# Patient Record
Sex: MALE | Race: WHITE | Employment: FULL TIME | URBAN - METROPOLITAN AREA
[De-identification: names, ages, dates, MRNs, and addresses within clinical notes are randomized per-mention and may not be internally consistent; named-entity substitution may affect disease eponyms.]

---

## 2024-08-22 ENCOUNTER — OFFICE VISIT (OUTPATIENT)
Dept: FAMILY MEDICINE CLINIC | Facility: CLINIC | Age: 34
End: 2024-08-22
Payer: COMMERCIAL

## 2024-08-22 ENCOUNTER — LAB ENCOUNTER (OUTPATIENT)
Dept: LAB | Age: 34
End: 2024-08-22
Attending: STUDENT IN AN ORGANIZED HEALTH CARE EDUCATION/TRAINING PROGRAM
Payer: COMMERCIAL

## 2024-08-22 VITALS
HEIGHT: 70 IN | OXYGEN SATURATION: 97 % | SYSTOLIC BLOOD PRESSURE: 100 MMHG | TEMPERATURE: 97 F | DIASTOLIC BLOOD PRESSURE: 70 MMHG | HEART RATE: 83 BPM | WEIGHT: 171 LBS | BODY MASS INDEX: 24.48 KG/M2

## 2024-08-22 DIAGNOSIS — Z23 NEED FOR VACCINATION: ICD-10-CM

## 2024-08-22 DIAGNOSIS — Z00.00 ENCOUNTER FOR ROUTINE HISTORY AND PHYSICAL EXAMINATION: Primary | ICD-10-CM

## 2024-08-22 DIAGNOSIS — Z13.6 SCREENING FOR CARDIOVASCULAR CONDITION: ICD-10-CM

## 2024-08-22 DIAGNOSIS — M77.12 LATERAL EPICONDYLITIS OF BOTH ELBOWS: ICD-10-CM

## 2024-08-22 DIAGNOSIS — M25.561 ACUTE PAIN OF RIGHT KNEE: ICD-10-CM

## 2024-08-22 DIAGNOSIS — M77.11 LATERAL EPICONDYLITIS OF BOTH ELBOWS: ICD-10-CM

## 2024-08-22 LAB
ALBUMIN SERPL-MCNC: 5 G/DL (ref 3.2–4.8)
ALBUMIN/GLOB SERPL: 2 {RATIO} (ref 1–2)
ALP LIVER SERPL-CCNC: 77 U/L
ALT SERPL-CCNC: 47 U/L
ANION GAP SERPL CALC-SCNC: 6 MMOL/L (ref 0–18)
AST SERPL-CCNC: 28 U/L (ref ?–34)
BASOPHILS # BLD AUTO: 0.03 X10(3) UL (ref 0–0.2)
BASOPHILS NFR BLD AUTO: 0.5 %
BILIRUB SERPL-MCNC: 0.6 MG/DL (ref 0.3–1.2)
BUN BLD-MCNC: 24 MG/DL (ref 9–23)
CALCIUM BLD-MCNC: 9.8 MG/DL (ref 8.7–10.4)
CHLORIDE SERPL-SCNC: 105 MMOL/L (ref 98–112)
CHOLEST SERPL-MCNC: 178 MG/DL (ref ?–200)
CO2 SERPL-SCNC: 27 MMOL/L (ref 21–32)
CREAT BLD-MCNC: 0.99 MG/DL
EGFRCR SERPLBLD CKD-EPI 2021: 103 ML/MIN/1.73M2 (ref 60–?)
EOSINOPHIL # BLD AUTO: 0.09 X10(3) UL (ref 0–0.7)
EOSINOPHIL NFR BLD AUTO: 1.5 %
ERYTHROCYTE [DISTWIDTH] IN BLOOD BY AUTOMATED COUNT: 12.3 %
FASTING PATIENT LIPID ANSWER: YES
FASTING STATUS PATIENT QL REPORTED: YES
GLOBULIN PLAS-MCNC: 2.5 G/DL (ref 2–3.5)
GLUCOSE BLD-MCNC: 97 MG/DL (ref 70–99)
HCT VFR BLD AUTO: 43.5 %
HDLC SERPL-MCNC: 50 MG/DL (ref 40–59)
HGB BLD-MCNC: 15.1 G/DL
IMM GRANULOCYTES # BLD AUTO: 0.01 X10(3) UL (ref 0–1)
IMM GRANULOCYTES NFR BLD: 0.2 %
LDLC SERPL CALC-MCNC: 102 MG/DL (ref ?–100)
LYMPHOCYTES # BLD AUTO: 1.79 X10(3) UL (ref 1–4)
LYMPHOCYTES NFR BLD AUTO: 29.7 %
MCH RBC QN AUTO: 30.1 PG (ref 26–34)
MCHC RBC AUTO-ENTMCNC: 34.7 G/DL (ref 31–37)
MCV RBC AUTO: 86.7 FL
MONOCYTES # BLD AUTO: 0.5 X10(3) UL (ref 0.1–1)
MONOCYTES NFR BLD AUTO: 8.3 %
NEUTROPHILS # BLD AUTO: 3.6 X10 (3) UL (ref 1.5–7.7)
NEUTROPHILS # BLD AUTO: 3.6 X10(3) UL (ref 1.5–7.7)
NEUTROPHILS NFR BLD AUTO: 59.8 %
NONHDLC SERPL-MCNC: 128 MG/DL (ref ?–130)
OSMOLALITY SERPL CALC.SUM OF ELEC: 290 MOSM/KG (ref 275–295)
PLATELET # BLD AUTO: 240 10(3)UL (ref 150–450)
POTASSIUM SERPL-SCNC: 3.9 MMOL/L (ref 3.5–5.1)
PROT SERPL-MCNC: 7.5 G/DL (ref 5.7–8.2)
RBC # BLD AUTO: 5.02 X10(6)UL
SODIUM SERPL-SCNC: 138 MMOL/L (ref 136–145)
TRIGL SERPL-MCNC: 146 MG/DL (ref 30–149)
VLDLC SERPL CALC-MCNC: 24 MG/DL (ref 0–30)
WBC # BLD AUTO: 6 X10(3) UL (ref 4–11)

## 2024-08-22 PROCEDURE — 80061 LIPID PANEL: CPT

## 2024-08-22 PROCEDURE — 36415 COLL VENOUS BLD VENIPUNCTURE: CPT

## 2024-08-22 PROCEDURE — 90715 TDAP VACCINE 7 YRS/> IM: CPT | Performed by: STUDENT IN AN ORGANIZED HEALTH CARE EDUCATION/TRAINING PROGRAM

## 2024-08-22 PROCEDURE — 90471 IMMUNIZATION ADMIN: CPT | Performed by: STUDENT IN AN ORGANIZED HEALTH CARE EDUCATION/TRAINING PROGRAM

## 2024-08-22 PROCEDURE — 99385 PREV VISIT NEW AGE 18-39: CPT | Performed by: STUDENT IN AN ORGANIZED HEALTH CARE EDUCATION/TRAINING PROGRAM

## 2024-08-22 PROCEDURE — 85025 COMPLETE CBC W/AUTO DIFF WBC: CPT

## 2024-08-22 PROCEDURE — 99203 OFFICE O/P NEW LOW 30 MIN: CPT | Performed by: STUDENT IN AN ORGANIZED HEALTH CARE EDUCATION/TRAINING PROGRAM

## 2024-08-22 PROCEDURE — 80053 COMPREHEN METABOLIC PANEL: CPT

## 2024-08-22 NOTE — PROGRESS NOTES
[facility]     CC: yearly exam     HPI: Alexx Sepulveda is 33 year old male here for a yearly physical.    1. Healthcare maintenance.  Exercise - exercises at home 5-6 times per week.  Sunscreen -.  Caffeine - 16 oz per day.  Advanced directives- not yet    2. Fhx diabetes. His brother and mother recently diagnosed, notes this may be related to lifestyle but it did prompt him to make this visit today. CMP with fasting glucose will be ordered.    3. Right nee pain. Worst after exercise . Worsened by Walking, standing, stooping. Pain is intermittent and not present today. Hx of HIIT exercise. He has not been doing high impact exercise such as running.  He has not noticed swelling or erythema in the knee.     4. Elbow pain. He describes lateral epicondyle pain that is intermittent and mild. No hx of injury or surgery. Left elbow is worst than right. Works in [employer] and has to pull items toward himself in repetitive movement against resistance.  He also notices the discomfort with weighted curling exercises.    PMH:  There is no problem list on file for this patient.    No past medical history on file.    Last Physical exam 8/22/24     SH: reviewed     FH: reviewed     Social History     Social History Narrative    Lives at home with wife and two dogs. Waffles and Alia. No tobacco. Social alcohol, less than 1 per week. Recreational cannibis.         ROS: full 10 point review of systems done and otherwise negative.  Denies any headaches, vision changes, congestion, sore throat, tinnitus, dizziness, cough, SOB, CP, palpitations, difficulty swallowing, n/v, c/d, blood in stools, urinary symptoms, LE edema, numbness/tingling in toes, focal weakness, joint pains, cold/heat intolerance or skin changes.     Healthcare Maintenance:  Flu vaccine: recommend getting updated shot this fall  Tdap: completed 8/22/24  COVID19- recommend update booster this fall    Health Habits:  Smoking.  Denies  Recreational cannibis  Alcohol Use. Rare <1/week  Dental Exam. UTD  Eye Exam. N/A       PE:  Vital Signs    08/22/24 1009   BP: 100/70   Pulse: 83   Temp: 97 °F (36.1 °C)     Wt Readings from Last 3 Encounters:   08/22/24 171 lb (77.6 kg)     Body mass index is 24.54 kg/m².     General: Comfortable, pleasant, NAD, appears stated age  HEENT.  NC/AT, no conjunctival injection, TMs clear, oropharynx without erythema or exudate, neck supple, no LAD or thyromegaly  CV.  RRR, no m/r/g  Pulm. CTAB, no W/R/R, comfortable work of breathing, speaks in full sentences  Abdomen. Soft, nt, nd  .  deferred  Extremities.  no edema  Skin.  No concerning moles    Knee:   Normal to inspection without effusion or obvious bony abnl  Palpation normal with no joint line tenderness, patellar tenderness, or condyle tenderness.  Patellar and quadriceps tendons unremarkable.  Hamstring and quadriceps strength is normal.      Elbow geoff.  Unremarkable to inspection.  Range of motion full pronation, supination, flexion, extension.  Strength is full to all of the above directions  +ttp bilateral lateral epicondyle     No results found for any previous visit.        A/P: Alexx Sepulveda is 33 year old yo male   1. Healthcare Maintenance. Discussed eye exam, dentist visit q6 months, sunscreen, exercise at least 5x/week, 30 minutes daily, and limiting caffeine intake.    2. Fhx diabetes. Fasting glucose ordered    3. Right knee pain. Anterior pain. Normal on exam today, recommend quad strengthening exercises, can pursue PT in the future if he is interested. Also discussed safe squatting/lifting. Can use voltaren gell as needed.    4. Bilateral epicondylitis. Topical voltaren, avoid aggravating activities, and counter force bracing discussed. Next steps would include PT/potentially an ortho referral if no improvement.       No outpatient encounter medications on file as of 8/22/2024.     No facility-administered encounter medications on file  as of 8/22/2024.